# Patient Record
(demographics unavailable — no encounter records)

---

## 2025-01-14 NOTE — PHYSICAL EXAM
[Chaperone Present] : A chaperone was present in the examining room during all aspects of the physical examination [00057] : A chaperone was present during the pelvic exam. [FreeTextEntry2] : Joanne [Appropriately responsive] : appropriately responsive [Alert] : alert [No Acute Distress] : no acute distress [Regular Rate Rhythm] : regular rate rhythm [Soft] : soft [Non-tender] : non-tender [Non-distended] : non-distended [Oriented x3] : oriented x3 [FreeTextEntry5] : nl work of breathing  [TextEntry] : Approx 2cm cyst on L side of superior aspect of gluteal cleft. No erythema or drainage. Nonfluctuant. Mildly tender to touch. Scar from previous drainage noted in area.

## 2025-01-14 NOTE — PLAN
[FreeTextEntry1] : 34yo presenting for eval of recurrent pilonidal cyst.   -Referral given for colorectal surgery. Recommend patient go for consult to discuss both drainage and prevention of recurrence -Recommend against going to the ED for drainage unless symptoms become unbearable as patient would benefit from discussion of if there is definitive management to prevent recurrence   Lianet Benton MD

## 2025-01-14 NOTE — HISTORY OF PRESENT ILLNESS
[FreeTextEntry1] : 34yo presents for eval for pilonidal cyst. Patient states that she has had this approx 1x/yr since adolescence and usually has it drained in the ED when pain becomes unbearable. She noted new swelling and pain over the last few weeks. Denies fevers/chills, drainage from the area. Patient states that she previously discussed this with MD Sample and was told to have the cyst drained when it was tense or fluctuant.

## 2025-03-28 NOTE — PHYSICAL EXAM
[Chaperone Present] : A chaperone was present in the examining room during all aspects of the physical examination [Examination Of The Breasts] : a normal appearance [Normal] : normal [Breast Mass Left Breast ___cm] : no mass was palpable [FreeTextEntry2] : Tom [Breast Palpation Diffuse Fibrous Tissue Bilateral] : fibrocystic changes [___cm] : a ~M [unfilled] ~Ucm superior medial quadrant mass was palpated

## 2025-03-28 NOTE — DISCUSSION/SUMMARY
[FreeTextEntry1] : 36y/o F presenting with Lt breast lump -Family hx of breast cancer- Referral given for breast mammogram and sonogram -f/u for annual 06/2025

## 2025-04-28 NOTE — HISTORY OF PRESENT ILLNESS
[FreeTextEntry1] : 36 y/o F with s/p breast biopsy presenting for follow up. Patient feeling well today - concerned about recent breast imaging and pathology. Breast biopsy 4/14/15 showing mastitis with abscess. Explained to patient unlikley malignancy but should f/u with breast surgeon. Possible hidraadenitis or another similar condition.

## 2025-04-28 NOTE — DISCUSSION/SUMMARY
[FreeTextEntry1] : 34 y/o F presenting for follow up -referral given for breast surgeon - needs a enw recommendation as Dr Mesa booked until June -referral given for dermatology -Annual due in Geri

## 2025-04-28 NOTE — REASON FOR VISIT
[Home] : at home, [unfilled] , at the time of the visit. [Other Location: e.g. Home (Enter Location, City,State)___] : at [unfilled] [Telehealth (audio & video)] : This visit was provided via telehealth using real-time 2-way audio visual technology. [Verbal consent obtained from patient] : the patient, [unfilled]

## 2025-06-28 NOTE — DISCUSSION/SUMMARY
[FreeTextEntry1] :  35 year old P0 presenting for annual exam -f/u pap and GC/CT -Depression Screen done: PHQ 2 - 0 -Folliculitis: Rx refill on clindamycin wash, will attempt to get clindamycin gel covered by insurance. Patient has f/u with breast surgery and derm -H/o breast mass: has f/u Monday with Dr. Vasques. Referral given for f/u breast sonogram (due 8/2025 per prior imaging) -Contraception: none -f/u PRN

## 2025-06-28 NOTE — DISCUSSION/SUMMARY
[FreeTextEntry1] :  35 year old P0 presenting for annual exam -f/u pap and GC/CT -Depression Screen done: PHQ 2 - 0 -Folliculitis: Rx refill on clindamycin wash, will attempt to get clindamycin gel covered by insurance. Patient has f/u with breast surgery and derm -H/o breast mass: has f/u Monday with Dr. Vasqeus. Referral given for f/u breast sonogram (due 8/2025 per prior imaging) -Contraception: none -f/u PRN

## 2025-06-28 NOTE — PHYSICAL EXAM
[Chaperoned Physical Exam] : A chaperone was present in the examining room during all aspects of the physical examination. [MA] : MA [Appropriately responsive] : appropriately responsive [Alert] : alert [No Acute Distress] : no acute distress [Soft] : soft [Non-tender] : non-tender [Non-distended] : non-distended [No HSM] : No HSM [No Lesions] : no lesions [No Mass] : no mass [Oriented x3] : oriented x3 [Examination Of The Breasts] : a normal appearance [No Masses] : no breast masses were palpable [Labia Majora] : normal [Labia Minora] : normal [Normal] : normal [Uterine Adnexae] : normal [FreeTextEntry2] : Magda

## 2025-06-28 NOTE — HISTORY OF PRESENT ILLNESS
[FreeTextEntry1] :  Patient is a 35 year old P1 presenting for an annual visit. LMP monthly. She is feeling well and is without complaints. She denies vaginal itching, odor and discharge. Denies urinary urgency, frequency and dysuria. She is currently sexually active. She has a history of folliculitis on the breasts and vulva - has some improvement with clindamycin wash.    OBHx:  2017, female, 7#11, 41 weeks

## 2025-07-01 NOTE — HISTORY OF PRESENT ILLNESS
[FreeTextEntry8] : Here for skin irritation on foot.  Rash on right foot-darker and itchier past 2 weeks.  Has been there 1 month.    Has been having issues with her breast.  Following with GYN-went for imaging-had biopsy.  Sent to breast surgeon. Saw for follow-up yesterday.  Repeat imaging.  Put on antibiotics from Dermatology for possible Hidradenitis Suppurativa.  Seeing Dermatology again on the 7th   Tried Nystatin-Triamcinolone-for 3 days-itching less.   Denies any changes to soaps, lotions, shampoos, personal products, detergents.

## 2025-07-01 NOTE — PHYSICAL EXAM
[No Acute Distress] : no acute distress [Well-Appearing] : well-appearing [No Edema] : there was no peripheral edema [No Extremity Clubbing/Cyanosis] : no extremity clubbing/cyanosis [Normal] : affect was normal and insight and judgment were intact [de-identified] : right medial foot-hyperpigmented patch with scaling; no erythema or drainage; peeling skin lateral foot; mild peeling left foot as well

## 2025-07-01 NOTE — PHYSICAL EXAM
[No Acute Distress] : no acute distress [Well-Appearing] : well-appearing [No Edema] : there was no peripheral edema [No Extremity Clubbing/Cyanosis] : no extremity clubbing/cyanosis [Normal] : affect was normal and insight and judgment were intact [de-identified] : right medial foot-hyperpigmented patch with scaling; no erythema or drainage; peeling skin lateral foot; mild peeling left foot as well

## 2025-07-01 NOTE — REVIEW OF SYSTEMS
[Itching] : Itching [Skin Rash] : skin rash [Negative] : Gastrointestinal [Fever] : no fever [Chills] : no chills [Nasal Discharge] : no nasal discharge [Sore Throat] : no sore throat [Chest Pain] : no chest pain [Palpitations] : no palpitations [Shortness Of Breath] : no shortness of breath [Wheezing] : no wheezing [Cough] : no cough [FreeTextEntry6] : Will feel winded after multiple flights of stairs [FreeTextEntry8] : LMP-06/15/25

## 2025-07-01 NOTE — PLAN
[FreeTextEntry1] : Tinea Pedis-start Clotrimazole.  Seeing Dermatology next week.  Advised to keep feet dry; cotton socks.  Thoroughly dry feet.   Asked to have notes from Breast Surgeon sent.   To return for CPE.

## 2025-07-09 NOTE — ASSESSMENT
[FreeTextEntry1] : #HS - involvement of breasts, groin, vulva #Abscess L medial breast -Chronic, flaring -Nature and progression of disease discussed -S/p doxycycline 100mg BID x 1 mo with inadequate improvement  -Continue f/u with breast surgeon - scheduled for breast US to evaluate L breast -Start spironolactone today - 50 mg daily for first week and then 100 mg daily after week 1. SED, need for diligent contraceptive use discussed -Continue BP wash and/or hibiclens wash daily -Briefly discussed biologics as next step if inadequate control, though patient's disease severity may not yet warrant   RTC 3 months

## 2025-07-09 NOTE — HISTORY OF PRESENT ILLNESS
[FreeTextEntry1] : RPV: SHONNA f/u [de-identified] : Ms. TIKI MITCHELL is a 35 year old F who presents for follow up for abscess on L breast, new diagnosis of HS. LPV 5/7/25 for L breast abscess with drainage. Since last visit, she completed 1 month of PO doxycycline BID and saw a breast surgeon who recommended breast US to assess the abscess, pt reports they suspect this is a different abscess that has formed than the one at last visit. Also reports new abscess on right breast that has since improved, nodule on left cheek, and nodule on right inguinal fold. No pain or drainage today from any site. Using BP wash and hibiclens prn.   Initial history: Pt reports history of painful cysts, some requiring drainage, under breasts, occasionally in axilla and groin as well though these tend to be more mild. Flare around menses. Has been a long time since got a bump that was this tender. Of note also had pilonidal cyst. Believes she took spironolactone in the past and used to get ILTAC injections with prior dermatologist. Not on OCP.

## 2025-07-09 NOTE — PHYSICAL EXAM
[Alert] : alert [Oriented x 3] : ~L oriented x 3 [Well Nourished] : well nourished [FreeTextEntry3] : Focused skin exam performed today per patient request. Significant for:  -Palpable soft fluctuant nodule L medial breast, no draining. -Scarred nodule on right medial breast -Scarring nodules on bilateral inguinal folds -Pustule on right inguinal fold -Non-draining nodule on left infraorbital cheek -numerous papules and pustules of b/l cheeks, jawline, chin  Exam of vulva and axillae deferred today.

## 2025-07-09 NOTE — HISTORY OF PRESENT ILLNESS
[FreeTextEntry1] : RPV: SHONNA f/u [de-identified] : Ms. TIKI MITCHELL is a 35 year old F who presents for follow up for abscess on L breast, new diagnosis of HS. LPV 5/7/25 for L breast abscess with drainage. Since last visit, she completed 1 month of PO doxycycline BID and saw a breast surgeon who recommended breast US to assess the abscess, pt reports they suspect this is a different abscess that has formed than the one at last visit. Also reports new abscess on right breast that has since improved, nodule on left cheek, and nodule on right inguinal fold. No pain or drainage today from any site. Using BP wash and hibiclens prn.   Initial history: Pt reports history of painful cysts, some requiring drainage, under breasts, occasionally in axilla and groin as well though these tend to be more mild. Flare around menses. Has been a long time since got a bump that was this tender. Of note also had pilonidal cyst. Believes she took spironolactone in the past and used to get ILTAC injections with prior dermatologist. Not on OCP. no